# Patient Record
Sex: MALE | Race: WHITE
[De-identification: names, ages, dates, MRNs, and addresses within clinical notes are randomized per-mention and may not be internally consistent; named-entity substitution may affect disease eponyms.]

---

## 2017-03-21 ENCOUNTER — HOSPITAL ENCOUNTER (OUTPATIENT)
Dept: HOSPITAL 44 - RAD | Age: 52
End: 2017-03-21
Attending: FAMILY MEDICINE
Payer: COMMERCIAL

## 2017-03-21 DIAGNOSIS — M79.605: Primary | ICD-10-CM

## 2017-03-21 PROCEDURE — 93971 EXTREMITY STUDY: CPT

## 2017-03-21 NOTE — DIAGNOSTIC IMAGING REPORT
JOSELITO MCKINNEY 

[facility]

[address] [facility] [address] [address]

 

 

 

 

Report Submission Date: Mar 21, 2017 1:45:07 PM CDT

Patient       Study

Name:   GALINA ALEX       Date:   Mar 21, 2017 1:04:55 PM CDT

MRN:   [id number]       Modality Type:   US

Gender:   M       Description:   UNILAT LTD STDY EXT VEINS

:   63       Institution:   [facility]

Physician:   JOSELITO MCKINNEY

         

 

 

Ultrasound venous Doppler of left lower extremity 



HISTORY:   

Left posterior calf pain for 1 week 



FINDINGS:   

Visualized portions of the left external iliac, common femoral, femoral, 
profunda femoris, greater saphenous, popliteal, and posterior tibial veins 
exhibit normal respiratory phasicity, compressibility, and augmentation without 
grayscale or color Doppler evidence of deep venous thrombosis.   



A thrombosed varicose vein is present in the posterior left calf, corresponding 
with the area of pain. 



IMPRESSION:   



Superficial thrombophlebitis in the posterior left calf. 



No evidence of left lower extremity deep venous thrombosis.

 

Electronically signed on Mar 21, 2017 1:45:07 PM CDT by:

Henry MOORE

## 2019-04-16 ENCOUNTER — HOSPITAL ENCOUNTER (OUTPATIENT)
Dept: HOSPITAL 44 - RAD | Age: 54
End: 2019-04-16
Attending: FAMILY MEDICINE
Payer: COMMERCIAL

## 2019-04-16 DIAGNOSIS — R05: Primary | ICD-10-CM

## 2019-04-16 PROCEDURE — 71046 X-RAY EXAM CHEST 2 VIEWS: CPT

## 2019-04-16 NOTE — DIAGNOSTIC IMAGING REPORT
JOSELITO MCKINNEY 

[facility]

[address] [facility].[address] [address]

 

 

 

 

Report Submission Date: 2019 9:36:51 AM CDT

Patient       Study

Name:   DANNIELLE ALEX       Date:   2019 9:20:00 AM CDT

MRN:   [id number]       Modality Type:   DX

Gender:   M       Description:   CHEST 2VIEW

:   63       Institution:   [facility]

Physician:   JOSELITO MCKINNEY

     Accession:    [id number]

 

 

Examination: PA and lateral chest. 



History: Evaluate lung fields. 



Comparison exam: None provided. 



Findings: PA and lateral views of the chest demonstrates a normal cardiac and 
mediastinal silhouette. No focal infiltrate.  No blunting of the costophrenic 
margins.  Osseous structures are appropriate for age. 



Impression: No acute pulmonary process.

 

Electronically signed on 2019 9:36:51 AM CDT by:

Hoang MOORE

## 2019-11-05 ENCOUNTER — HOSPITAL ENCOUNTER (OUTPATIENT)
Dept: HOSPITAL 44 - LAB | Age: 54
End: 2019-11-05
Attending: FAMILY MEDICINE
Payer: COMMERCIAL

## 2019-11-05 DIAGNOSIS — Z13.220: Primary | ICD-10-CM

## 2019-11-05 DIAGNOSIS — Z12.5: ICD-10-CM

## 2019-11-05 LAB
EGFR (NON-AFRICAN): > 60
HDL: 48 MG/DL (ref 40–?)

## 2019-11-05 PROCEDURE — 84153 ASSAY OF PSA TOTAL: CPT

## 2019-11-05 PROCEDURE — 80061 LIPID PANEL: CPT

## 2019-11-05 PROCEDURE — 80053 COMPREHEN METABOLIC PANEL: CPT

## 2019-11-05 PROCEDURE — 36415 COLL VENOUS BLD VENIPUNCTURE: CPT
